# Patient Record
(demographics unavailable — no encounter records)

---

## 2024-11-13 NOTE — PHYSICAL EXAM
[Soft] : abdomen soft [Non Tender] : non-tender [No HSM] : no HSM [Normal Bowel Sounds] : normal bowel sounds [de-identified] : WDWN in NAD HEENT:  unremarkable Neck:  supple, no JVD, no LN Lungs:  CTA B/L, no W/R/R Heart:  Reg rate, +S1S2, no M/R/G Abdomen:  soft, NT, ND, +BS, no masses, no HS-megaly Genital: No pubic or genital lesions noted. Ext:  no C/C/E Neuro:  no focal deficits [de-identified] : throat is not crowded, neck area's scar r/w parathyroidectomy clean and healed [de-identified] : Rt inguinal bulging mass( moderate sized), reducible, no skin color changing.

## 2024-11-13 NOTE — RESULTS/DATA
[de-identified] : Reviewed LAB by [facility] on 11/5/2024 : CBC, CMP normal  except mildly increased WBC to 11.5. He does not have fever, chills, any sx at this time. Latest A1C 6.3% on 9/4/24. Reviewed ECG 11/13/2024 : results reviewed, SR, HR 63bpm, nl axis/intervals, no acute ST/T changes, no LVH.

## 2024-11-13 NOTE — RESULTS/DATA
[de-identified] : Reviewed LAB by [facility] on 11/5/2024 : CBC, CMP normal  except mildly increased WBC to 11.5. He does not have fever, chills, any sx at this time. Latest A1C 6.3% on 9/4/24. Reviewed ECG 11/13/2024 : results reviewed, SR, HR 63bpm, nl axis/intervals, no acute ST/T changes, no LVH.

## 2024-11-13 NOTE — PHYSICAL EXAM
[Soft] : abdomen soft [Non Tender] : non-tender [No HSM] : no HSM [Normal Bowel Sounds] : normal bowel sounds [de-identified] : WDWN in NAD HEENT:  unremarkable Neck:  supple, no JVD, no LN Lungs:  CTA B/L, no W/R/R Heart:  Reg rate, +S1S2, no M/R/G Abdomen:  soft, NT, ND, +BS, no masses, no HS-megaly Genital: No pubic or genital lesions noted. Ext:  no C/C/E Neuro:  no focal deficits [de-identified] : throat is not crowded, neck area's scar r/w parathyroidectomy clean and healed [de-identified] : Rt inguinal bulging mass( moderate sized), reducible, no skin color changing.

## 2024-11-13 NOTE — HISTORY OF PRESENT ILLNESS
[No Pertinent Cardiac History] : no history of aortic stenosis, atrial fibrillation, coronary artery disease, recent myocardial infarction, or implantable device/pacemaker [No Pertinent Pulmonary History] : no history of asthma, COPD, sleep apnea, or smoking [No Adverse Anesthesia Reaction] : no adverse anesthesia reaction in self or family member [(Patient denies any chest pain, claudication, dyspnea on exertion, orthopnea, palpitations or syncope)] : Patient denies any chest pain, claudication, dyspnea on exertion, orthopnea, palpitations or syncope [Moderate (4-6 METs)] : Moderate (4-6 METs) [Chronic Anticoagulation] : no chronic anticoagulation [Chronic Kidney Disease] : no chronic kidney disease [Diabetes] : no diabetes [FreeTextEntry1] : Rt inguinal hernia repair [FreeTextEntry2] : 11/19/24 [FreeTextEntry3] : Igor Garrido. [FreeTextEntry4] : Mr. ASHLEE EPPS is a 73 year old White  male  with history of HTN, BPH, DM, asthma, hypercalcemia, hyperparathyroidism, LBP, osteoarthritis of multiple joints, renal cyst  presented today for preop medical clearance. Last seen by Dr. Vidal 9/4/24. Pt is a retired optometrist.  s/p parathyroidectomy by Dr. Norris 4/15/24 with success. His latest Serum calcium level 9.0  Reviewed note by Dr. Chamorro, surgery on 10/28/24: seen for intermittent right groin pain.  Of note, the patient had open inguinal hernia a couple years ago which resulted in, he states, an ileus for which he required hospitalization, NG decompression, which resolved. He will get laparoscopic Rt inguinal hernia repair. Lt inguinal scar has no obvious hernia recurrence.   Today he came alone for Rt inguinal hernia repair medical clearance. Pt tells me that his Rt inguinal bulge is moderate sized and bothersome. He moves his bowel regularly with mild constipation, and noticed increased gas pain. The rt inguinal hernia is reducible. He knows ER visit sx such as skin color change and severe pain. Denied fever, chills,CP, SOB, abdominal pain.

## 2024-11-13 NOTE — ASSESSMENT
[High Risk Surgery - Intraperitoneal, Intrathoracic or Supringuinal Vascular Procedures] : High Risk Surgery - Intraperitoneal, Intrathoracic or Supringuinal Vascular Procedures - No (0) [Ischemic Heart Disease] : Ischemic Heart Disease - No (0) [Congestive Heart Failure] : Congestive Heart Failure - No (0) [Prior Cerebrovascular Accident or TIA] : Prior Cerebrovascular Accident or TIA - No (0) [Creatinine >= 2mg/dL (1 Point)] : Creatinine >= 2mg/dL - No (0) [Insulin-dependent Diabetic (1 Point)] : Insulin-dependent Diabetic - No (0) [0] : 0 , RCRI Class: I, Risk of Post-Op Cardiac Complications: 3.9%, 95% CI for Risk Estimate: 2.8% - 5.4% [Patient Optimized for Surgery] : Patient optimized for surgery [No Further Testing Recommended] : no further testing recommended [Continue medications as is] : Continue current medications [As per surgery] : as per surgery [FreeTextEntry4] : Mr. ASHLEE EPPS is a 73 year old White  male  with history of HTN, BPH, DM, asthma, hypercalcemia, hyperparathyroidism, LBP, osteoarthritis of multiple joints, renal cyst  presented today for preop medical clearance. Pt is low risk candidate for low risk procedure with no further w/up required prior to planned surgery and no contraindication to proceeding with planned surgery.   [FreeTextEntry7] : Do not take Aspirin, NSAID( Motrin, Ibuprofen etc.), Herb, supplement 7 days prior to surgery. Take _Losartan__ with small sips of water in the morning of surgery.

## 2025-03-07 NOTE — PHYSICAL EXAM
[No Acute Distress] : no acute distress [Well Nourished] : well nourished [Well Developed] : well developed [Well-Appearing] : well-appearing [No JVD] : no jugular venous distention [Supple] : supple [No Joint Swelling] : no joint swelling [Grossly Normal Strength/Tone] : grossly normal strength/tone [Coordination Grossly Intact] : coordination grossly intact [No Focal Deficits] : no focal deficits [de-identified] : Tinel's negative; Phalen's negative; Spurling's positive on R [de-identified] : all intrinsic hand muscles/thenar eminence with full 5/5 strength b/l

## 2025-03-07 NOTE — HISTORY OF PRESENT ILLNESS
[FreeTextEntry8] : Came bc wanted to discuss dysesthesias that he still gets at R digits 1-3  and at same distribution L hand as well, although R > L.  Finds he can wake up numb, takes some time to shake out, 'get those fingers going'.  Finds he can quickly bring the symptoms, particularly on R, when he lies back with neck fully extended in bed, also feels he can reproduce when he rotates head/neck to left and looks up.  With this has no subjective weakness in any hand muscles - he recalls some of our conversations about this in past.  Lastly he can notice some exacerbation of the same when he is looking straight ahead at screen and using a mouse w R hand for extended period.

## 2025-03-07 NOTE — ASSESSMENT
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  No motor changes.  He does not want to pursue any intervention or medication, not bothering him enough.  Reassured about the absence of motor change, understands importance of this as observation that would drive further management/scan/intervention.  He will report back any changes.  2) s/p inguinal hernia repair - did well.  3) next cpe/AMW 9/24.    24 minutes were spent in preparation of this visit, including review of previous notes and test results, interview and examination of patient, contact with other care contributors, discussion of plan, arranging for appropriate testing and treatment, and documentation.